# Patient Record
Sex: FEMALE | Race: WHITE | Employment: FULL TIME | URBAN - METROPOLITAN AREA
[De-identification: names, ages, dates, MRNs, and addresses within clinical notes are randomized per-mention and may not be internally consistent; named-entity substitution may affect disease eponyms.]

---

## 2018-11-28 ENCOUNTER — OFFICE VISIT (OUTPATIENT)
Dept: OBGYN | Age: 23
End: 2018-11-28
Payer: COMMERCIAL

## 2018-11-28 ENCOUNTER — HOSPITAL ENCOUNTER (OUTPATIENT)
Age: 23
Setting detail: SPECIMEN
Discharge: HOME | End: 2018-11-28
Payer: COMMERCIAL

## 2018-11-28 VITALS
SYSTOLIC BLOOD PRESSURE: 124 MMHG | WEIGHT: 172 LBS | DIASTOLIC BLOOD PRESSURE: 66 MMHG | HEART RATE: 82 BPM | BODY MASS INDEX: 26.15 KG/M2

## 2018-11-28 DIAGNOSIS — Z01.419 WOMEN'S ANNUAL ROUTINE GYNECOLOGICAL EXAMINATION: ICD-10-CM

## 2018-11-28 DIAGNOSIS — Z01.419 WOMEN'S ANNUAL ROUTINE GYNECOLOGICAL EXAMINATION: Primary | ICD-10-CM

## 2018-11-28 DIAGNOSIS — Z00.00 PREVENTATIVE HEALTH CARE: ICD-10-CM

## 2018-11-28 DIAGNOSIS — N94.6 DYSMENORRHEA: ICD-10-CM

## 2018-11-28 PROBLEM — Z78.9 UNDER CARE OF SOCIAL WORKER: Status: ACTIVE | Noted: 2018-11-28

## 2018-11-28 LAB
DIRECT EXAM: ABNORMAL
Lab: ABNORMAL
SPECIMEN DESCRIPTION: ABNORMAL
STATUS: ABNORMAL

## 2018-11-28 PROCEDURE — 90688 IIV4 VACCINE SPLT 0.5 ML IM: CPT | Performed by: STUDENT IN AN ORGANIZED HEALTH CARE EDUCATION/TRAINING PROGRAM

## 2018-11-28 PROCEDURE — 99212 OFFICE O/P EST SF 10 MIN: CPT | Performed by: STUDENT IN AN ORGANIZED HEALTH CARE EDUCATION/TRAINING PROGRAM

## 2018-11-28 PROCEDURE — 99385 PREV VISIT NEW AGE 18-39: CPT | Performed by: STUDENT IN AN ORGANIZED HEALTH CARE EDUCATION/TRAINING PROGRAM

## 2018-11-28 PROCEDURE — 90651 9VHPV VACCINE 2/3 DOSE IM: CPT | Performed by: OBSTETRICS & GYNECOLOGY

## 2018-11-28 NOTE — PATIENT INSTRUCTIONS
reduces your risk of getting HIV and other STDs. Keep in mind that if you use other forms of birth control but also want some protection against HIV and other sexually transmitted diseases, the man should also use a latex condom. Simultaneous use of a spermicide with condom will further reduce the chance of pregnancy. The female condom Female condoms work in a similar way as the male condom in preventing the passage of sperm. It may help reduce your risk of getting STDs, but not as effectively as the male condom. DepoProvera This is a shot taken every three months that uses progestin to prevent pregnancy. It is highly effective as birth control, but does not protect against STDs. Intrauterine device (IUD) An IUD is a T-shaped device inserted through the vagina and into the uterus by a doctor. It prevents fertilization and is a convenient and highly effective form of contraceptive, but offers no protection against STDs. Contraceptive patch The patch (eg, Ortho Evra ), worn on the skin, delivers the hormones estrogen and progestin to the bloodstream. The patch is changed weekly. It is as effective as the pill. Vaginal ring This is a thin, flexible ring that is inserted into the vagina and worn for three-week periods. The ring delivers estrogen and progestin. It is also as effective as birth control pills. Diaphragms or cervical caps These devices are available by prescription. They are used with spermicides and are inserted in the vagina against the cervix to block the passage of sperm. Sponge Available over-the-counter, the sponge is made of plastic foam and has a spermicide. The device is inserted into the vagina before having sex, then removed after. The sponge does not protect against STDs. Compared to the diaphragm, the sponge may not be as effective in preventing pregnancy.      Emergency contraception Emergency contraception refers to a series of contraceptive pills taken soon after

## 2018-11-29 LAB
C TRACH DNA GENITAL QL NAA+PROBE: ABNORMAL
N. GONORRHOEAE DNA: NEGATIVE

## 2018-11-30 DIAGNOSIS — A74.9 CHLAMYDIA: ICD-10-CM

## 2018-12-04 DIAGNOSIS — A74.9 CHLAMYDIA: Primary | ICD-10-CM

## 2018-12-04 RX ORDER — AZITHROMYCIN 500 MG/1
1000 TABLET, FILM COATED ORAL ONCE
Qty: 2 TABLET | Refills: 0 | Status: SHIPPED | OUTPATIENT
Start: 2018-12-04 | End: 2018-12-04

## 2018-12-11 LAB — CYTOLOGY REPORT: NORMAL

## 2018-12-13 ENCOUNTER — TELEPHONE (OUTPATIENT)
Dept: OBGYN | Age: 23
End: 2018-12-13

## 2018-12-13 NOTE — TELEPHONE ENCOUNTER
1213/18 writer informed pt of (-) pap smear and of medication sent to the [facility] on [address] pt verbalized understanding.

## 2018-12-13 NOTE — TELEPHONE ENCOUNTER
----- Message from Radha Anne DO sent at 12/4/2018 10:23 PM EST -----  Regarding: Rx  I did not find a [facility] on [address]  I sent it to the following [facility] since it was closest to [place]:    [address] [facility]  Store number: [id number]    Please inform patient    Thank you      ----- Message -----  From: Rolando Zazueta MA  Sent: 12/3/2018  10:07 AM  To: Radha Anne DO    Pt states send Rx to [facility] on [address] did not specific which one.  ----- Message -----  From: Radha Anne DO  Sent: 11/30/2018  11:50 PM  To: [facility] Clinical Support Pool    Please inform patient that her swabs were positive for Chlamydia. Will need to have 1g Azithromycin sent to patient's pharmacy, however, no pharmacy in patient's chart. Please call patient to direct with pharmacy to send Rx to.

## 2019-01-23 ENCOUNTER — TELEPHONE (OUTPATIENT)
Dept: OBGYN | Age: 24
End: 2019-01-23

## 2019-01-28 ENCOUNTER — TELEPHONE (OUTPATIENT)
Dept: OBGYN | Age: 24
End: 2019-01-28